# Patient Record
Sex: MALE | Race: OTHER | HISPANIC OR LATINO | URBAN - METROPOLITAN AREA
[De-identification: names, ages, dates, MRNs, and addresses within clinical notes are randomized per-mention and may not be internally consistent; named-entity substitution may affect disease eponyms.]

---

## 2018-08-07 ENCOUNTER — EMERGENCY (EMERGENCY)
Facility: HOSPITAL | Age: 33
LOS: 1 days | Discharge: HOME | End: 2018-08-07
Attending: EMERGENCY MEDICINE
Payer: COMMERCIAL

## 2018-08-07 VITALS
HEART RATE: 100 BPM | RESPIRATION RATE: 18 BRPM | TEMPERATURE: 99 F | SYSTOLIC BLOOD PRESSURE: 124 MMHG | DIASTOLIC BLOOD PRESSURE: 77 MMHG | OXYGEN SATURATION: 97 %

## 2018-08-07 VITALS — WEIGHT: 242.07 LBS | HEIGHT: 67 IN

## 2018-08-07 LAB
ALBUMIN SERPL ELPH-MCNC: 4.4 G/DL — SIGNIFICANT CHANGE UP (ref 3.3–5.2)
ALP SERPL-CCNC: 87 U/L — SIGNIFICANT CHANGE UP (ref 40–120)
ALT FLD-CCNC: 56 U/L — HIGH
ANION GAP SERPL CALC-SCNC: 15 MMOL/L — SIGNIFICANT CHANGE UP (ref 5–17)
AST SERPL-CCNC: 32 U/L — SIGNIFICANT CHANGE UP
BASOPHILS # BLD AUTO: 0 K/UL — SIGNIFICANT CHANGE UP (ref 0–0.2)
BASOPHILS NFR BLD AUTO: 0.1 % — SIGNIFICANT CHANGE UP (ref 0–2)
BILIRUB SERPL-MCNC: 0.5 MG/DL — SIGNIFICANT CHANGE UP (ref 0.4–2)
BUN SERPL-MCNC: 12 MG/DL — SIGNIFICANT CHANGE UP (ref 8–20)
CALCIUM SERPL-MCNC: 9.3 MG/DL — SIGNIFICANT CHANGE UP (ref 8.6–10.2)
CHLORIDE SERPL-SCNC: 100 MMOL/L — SIGNIFICANT CHANGE UP (ref 98–107)
CO2 SERPL-SCNC: 24 MMOL/L — SIGNIFICANT CHANGE UP (ref 22–29)
CREAT SERPL-MCNC: 0.77 MG/DL — SIGNIFICANT CHANGE UP (ref 0.5–1.3)
EOSINOPHIL # BLD AUTO: 0 K/UL — SIGNIFICANT CHANGE UP (ref 0–0.5)
EOSINOPHIL NFR BLD AUTO: 0.1 % — SIGNIFICANT CHANGE UP (ref 0–6)
GLUCOSE SERPL-MCNC: 91 MG/DL — SIGNIFICANT CHANGE UP (ref 70–115)
HCT VFR BLD CALC: 45.5 % — SIGNIFICANT CHANGE UP (ref 42–52)
HGB BLD-MCNC: 15.5 G/DL — SIGNIFICANT CHANGE UP (ref 14–18)
LIDOCAIN IGE QN: 30 U/L — SIGNIFICANT CHANGE UP (ref 22–51)
LYMPHOCYTES # BLD AUTO: 1.8 K/UL — SIGNIFICANT CHANGE UP (ref 1–4.8)
LYMPHOCYTES # BLD AUTO: 16.2 % — LOW (ref 20–55)
MCHC RBC-ENTMCNC: 28.7 PG — SIGNIFICANT CHANGE UP (ref 27–31)
MCHC RBC-ENTMCNC: 34.1 G/DL — SIGNIFICANT CHANGE UP (ref 32–36)
MCV RBC AUTO: 84.1 FL — SIGNIFICANT CHANGE UP (ref 80–94)
MONOCYTES # BLD AUTO: 0.8 K/UL — SIGNIFICANT CHANGE UP (ref 0–0.8)
MONOCYTES NFR BLD AUTO: 7.5 % — SIGNIFICANT CHANGE UP (ref 3–10)
NEUTROPHILS # BLD AUTO: 8.2 K/UL — HIGH (ref 1.8–8)
NEUTROPHILS NFR BLD AUTO: 75.8 % — HIGH (ref 37–73)
PLATELET # BLD AUTO: 226 K/UL — SIGNIFICANT CHANGE UP (ref 150–400)
POTASSIUM SERPL-MCNC: 3.6 MMOL/L — SIGNIFICANT CHANGE UP (ref 3.5–5.3)
POTASSIUM SERPL-SCNC: 3.6 MMOL/L — SIGNIFICANT CHANGE UP (ref 3.5–5.3)
PROT SERPL-MCNC: 7.4 G/DL — SIGNIFICANT CHANGE UP (ref 6.6–8.7)
RBC # BLD: 5.41 M/UL — SIGNIFICANT CHANGE UP (ref 4.6–6.2)
RBC # FLD: 13.8 % — SIGNIFICANT CHANGE UP (ref 11–15.6)
SODIUM SERPL-SCNC: 139 MMOL/L — SIGNIFICANT CHANGE UP (ref 135–145)
WBC # BLD: 10.8 K/UL — SIGNIFICANT CHANGE UP (ref 4.8–10.8)
WBC # FLD AUTO: 10.8 K/UL — SIGNIFICANT CHANGE UP (ref 4.8–10.8)

## 2018-08-07 PROCEDURE — T1013: CPT

## 2018-08-07 PROCEDURE — 74177 CT ABD & PELVIS W/CONTRAST: CPT | Mod: 26

## 2018-08-07 PROCEDURE — 36415 COLL VENOUS BLD VENIPUNCTURE: CPT

## 2018-08-07 PROCEDURE — 96374 THER/PROPH/DIAG INJ IV PUSH: CPT | Mod: XU

## 2018-08-07 PROCEDURE — 83690 ASSAY OF LIPASE: CPT

## 2018-08-07 PROCEDURE — 85027 COMPLETE CBC AUTOMATED: CPT

## 2018-08-07 PROCEDURE — 96375 TX/PRO/DX INJ NEW DRUG ADDON: CPT

## 2018-08-07 PROCEDURE — 80053 COMPREHEN METABOLIC PANEL: CPT

## 2018-08-07 PROCEDURE — 74177 CT ABD & PELVIS W/CONTRAST: CPT

## 2018-08-07 PROCEDURE — 99284 EMERGENCY DEPT VISIT MOD MDM: CPT

## 2018-08-07 PROCEDURE — 99284 EMERGENCY DEPT VISIT MOD MDM: CPT | Mod: 25

## 2018-08-07 RX ORDER — KETOROLAC TROMETHAMINE 30 MG/ML
30 SYRINGE (ML) INJECTION ONCE
Qty: 0 | Refills: 0 | Status: DISCONTINUED | OUTPATIENT
Start: 2018-08-07 | End: 2018-08-07

## 2018-08-07 RX ORDER — METOCLOPRAMIDE HCL 10 MG
10 TABLET ORAL ONCE
Qty: 0 | Refills: 0 | Status: COMPLETED | OUTPATIENT
Start: 2018-08-07 | End: 2018-08-07

## 2018-08-07 RX ORDER — SODIUM CHLORIDE 9 MG/ML
1000 INJECTION INTRAMUSCULAR; INTRAVENOUS; SUBCUTANEOUS
Qty: 0 | Refills: 0 | Status: DISCONTINUED | OUTPATIENT
Start: 2018-08-07 | End: 2018-08-12

## 2018-08-07 RX ORDER — ONDANSETRON 8 MG/1
1 TABLET, FILM COATED ORAL
Qty: 9 | Refills: 0 | OUTPATIENT
Start: 2018-08-07 | End: 2018-08-09

## 2018-08-07 RX ORDER — IBUPROFEN 200 MG
1 TABLET ORAL
Qty: 15 | Refills: 0 | OUTPATIENT
Start: 2018-08-07 | End: 2018-08-11

## 2018-08-07 RX ORDER — SODIUM CHLORIDE 9 MG/ML
1000 INJECTION INTRAMUSCULAR; INTRAVENOUS; SUBCUTANEOUS ONCE
Qty: 0 | Refills: 0 | Status: COMPLETED | OUTPATIENT
Start: 2018-08-07 | End: 2018-08-07

## 2018-08-07 RX ADMIN — Medication 10 MILLIGRAM(S): at 16:04

## 2018-08-07 RX ADMIN — SODIUM CHLORIDE 1000 MILLILITER(S): 9 INJECTION INTRAMUSCULAR; INTRAVENOUS; SUBCUTANEOUS at 16:04

## 2018-08-07 RX ADMIN — Medication 30 MILLIGRAM(S): at 16:04

## 2018-08-07 RX ADMIN — Medication 1 TABLET(S): at 19:58

## 2018-08-07 RX ADMIN — SODIUM CHLORIDE 1000 MILLILITER(S): 9 INJECTION INTRAMUSCULAR; INTRAVENOUS; SUBCUTANEOUS at 18:52

## 2018-08-07 RX ADMIN — SODIUM CHLORIDE 200 MILLILITER(S): 9 INJECTION INTRAMUSCULAR; INTRAVENOUS; SUBCUTANEOUS at 19:36

## 2018-08-07 RX ADMIN — Medication 30 MILLIGRAM(S): at 18:52

## 2018-08-07 NOTE — ED STATDOCS - DIAGNOSTIC INTERPRETATION
CT report reviewed, findings discussed with patient, po abxs for ileitis, referred to GI for outpatient evaln due to possible crohns as per CT.

## 2018-08-07 NOTE — ED ADULT NURSE NOTE - OBJECTIVE STATEMENT
pt presnets to ED with mid abd pain now resolved with n/v/d, afebrile. pt sent to ED by PMD. afebrile. denies urinary complaints. a&ox3. will continue to monitor and reassess

## 2018-08-07 NOTE — ED STATDOCS - OBJECTIVE STATEMENT
USOH until this early am when developed N/V/D and abdominal pain, generalized but RLQ on palpation, seen by PMD = Dr. Marin who referred to ED for CT to evaluate for APPY and aggressive hydration due to anorexia and decrease po.  Patient admits to chills but denies fever.  No urinary symptoms, hematuria, penile discharge, rash.  No seafood, travel, sick contacts.    PMH/PSH = denies  ALL = NKDA  MEDS = denies, not self-medicating  SH = denies smoking/drinking

## 2018-08-07 NOTE — ED STATDOCS - CARE PLAN
Principal Discharge DX:	Ileitis  Secondary Diagnosis:	Non-intractable vomiting with nausea, unspecified vomiting type

## 2018-08-07 NOTE — ED ADULT NURSE NOTE - NSIMPLEMENTINTERV_GEN_ALL_ED
Implemented All Universal Safety Interventions:  [place] to call system. Call bell, personal items and telephone within reach. Instruct patient to call for assistance. Room bathroom lighting operational. Non-slip footwear when patient is off stretcher. Physically safe environment: no spills, clutter or unnecessary equipment. Stretcher in lowest position, wheels locked, appropriate side rails in place.

## 2018-08-07 NOTE — ED STATDOCS - MEDICAL DECISION MAKING DETAILS
abd pain, mostly RLQ, with N/V/D, sent by PMD to evaluated for APPY  line, labs with lipase for pancreatitis, urine for uti/presence of blood, CT for APPY  hydrate agressively, treat symptomatically, check results, reassess

## 2018-08-07 NOTE — ED STATDOCS - TEMPLATE, MLM
General Ear Star Wedge Flap Text: The defect edges were debeveled with a #15 blade scalpel.  Given the location of the defect and the proximity to free margins (helical rim) an ear star wedge flap was deemed most appropriate.  Using a sterile surgical marker, the appropriate flap was drawn incorporating the defect and placing the expected incisions between the helical rim and antihelix where possible.  The area thus outlined was incised through and through with a #15 scalpel blade.

## 2018-08-10 PROBLEM — Z00.00 ENCOUNTER FOR PREVENTIVE HEALTH EXAMINATION: Status: ACTIVE | Noted: 2018-08-10

## 2018-10-18 ENCOUNTER — APPOINTMENT (OUTPATIENT)
Dept: GASTROENTEROLOGY | Facility: CLINIC | Age: 33
End: 2018-10-18

## 2019-03-30 ENCOUNTER — APPOINTMENT (OUTPATIENT)
Dept: CARDIOLOGY | Facility: CLINIC | Age: 34
End: 2019-03-30

## 2020-04-06 ENCOUNTER — EMERGENCY (EMERGENCY)
Facility: HOSPITAL | Age: 35
LOS: 1 days | Discharge: HOME | End: 2020-04-06
Payer: COMMERCIAL

## 2020-04-06 VITALS
WEIGHT: 251.33 LBS | DIASTOLIC BLOOD PRESSURE: 94 MMHG | SYSTOLIC BLOOD PRESSURE: 131 MMHG | TEMPERATURE: 100 F | HEART RATE: 105 BPM | OXYGEN SATURATION: 97 % | RESPIRATION RATE: 20 BRPM

## 2020-04-06 PROCEDURE — 99283 EMERGENCY DEPT VISIT LOW MDM: CPT

## 2020-04-06 PROCEDURE — T1013: CPT

## 2020-04-06 PROCEDURE — 99282 EMERGENCY DEPT VISIT SF MDM: CPT

## 2020-04-06 NOTE — ED PROVIDER NOTE - PATIENT PORTAL LINK FT
You can access the FollowMyHealth Patient Portal offered by [facility] by registering at the following website: [email or web address]. By joining [employer]’s FollowMyHealth portal, you will also be able to view your health information using other applications (apps) compatible with our system.

## 2020-04-06 NOTE — ED PROVIDER NOTE - CLINICAL SUMMARY MEDICAL DECISION MAKING FREE TEXT BOX
male hx of htn low grade fever no respiratory distress saturating 97 with ambulation. advised on viral illness self quarantine and given strict return precautions

## 2020-04-06 NOTE — ED PROVIDER NOTE - PROGRESS NOTE DETAILS
advised on viral symptoms concerning for covid infection advised on self quaraintine, monitoring symptoms, antipyretic/analgesia use and given strict return precautions   verbalizes understanding advised on viral symptoms concerning for covid infection advised on self quarantine, monitoring symptoms, antipyretic/analgesia use and given strict return precautions   verbalizes understanding

## 2020-04-06 NOTE — ED PROVIDER NOTE - OBJECTIVE STATEMENT
36 yo male hx of htn presenting to the ER with cough and fever since friday.  no chest pain no sob. states that he was coughing at work today and his boss sent him to the ER 36 yo male hx of htn presenting to the ER with cough and fever since Friday.  no chest pain no sob. states that he was coughing at work today and his boss sent him to the ER denies sick contacts or travel

## 2021-12-30 NOTE — ED ADULT TRIAGE NOTE - AS HEIGHT TYPE
Central Prior Authorization Team   Phone: [phone]      PA Initiation    Medication: testosterone (ANDROGEL) 40.5 MG/2.5GM (1.62%) GEL-PA initiated  Insurance Company: [employer] - Phone [phone] Fax [phone]  Pharmacy Filling the Rx: [facility] - [place] - [id number] [facility]  Filling Pharmacy Phone: [phone]  Filling Pharmacy Fax:    Start Date: 12/29/2021      
Prior Authorization Approval    Authorization Effective Date: 11/29/2021  Authorization Expiration Date: 12/29/2022  Medication: testosterone (ANDROGEL) 40.5 MG/2.5GM (1.62%) GEL-PA approved  Approved Dose/Quantity:   Reference #: [id number]   Insurance Company: [employer] - Phone [phone] Fax [phone]  Expected CoPay:       CoPay Card Available:      Foundation Assistance Needed:    Which Pharmacy is filling the prescription (Not needed for infusion/clinic administered): [facility] - [place] - [id number] [facility]  Pharmacy Notified: Yes  Patient Notified: No      
Prior Authorization Specialty Medication Request    Medication/Dose:   testosterone (ANDROGEL) 40.5 MG/2.5GM (1.62%)  g 5 11/18/2021  No   Sig - Route: Place 1 packet (40.5 mg) onto the skin daily - Transdermal       ICD code (if different than what is on RX):    Previously Tried and Failed:       Important Lab Values: Result Note     1 Patient Communication      Component Ref Range & Units 1 mo ago 10 mo ago 1 yr ago 2 yr ago 3 yr ago 4 yr ago 5 yr ago    Testosterone Total 240 - 950 ng/dL 357  309 CM  236 Low  CM               Rationale: every 6 months a prior authorization needs to be done. Long term stable on medication.      Insurance Name:   Insurance ID:   Insurance Phone Number:     Pharmacy Information (if different than what is on RX)  Name:    Phone:            
stated

## 2023-01-17 ENCOUNTER — EMERGENCY (EMERGENCY)
Facility: HOSPITAL | Age: 38
LOS: 1 days | Discharge: LEFT BEFORE DISCHARGE | End: 2023-01-17
Payer: SELF-PAY

## 2023-01-17 PROCEDURE — L9992: CPT

## 2023-04-03 ENCOUNTER — EMERGENCY (EMERGENCY)
Facility: HOSPITAL | Age: 38
LOS: 1 days | Discharge: HOME | End: 2023-04-03
Attending: STUDENT IN AN ORGANIZED HEALTH CARE EDUCATION/TRAINING PROGRAM
Payer: COMMERCIAL

## 2023-04-03 VITALS
HEART RATE: 80 BPM | HEIGHT: 68 IN | OXYGEN SATURATION: 99 % | RESPIRATION RATE: 18 BRPM | DIASTOLIC BLOOD PRESSURE: 103 MMHG | WEIGHT: 211.64 LBS | SYSTOLIC BLOOD PRESSURE: 148 MMHG | TEMPERATURE: 98 F

## 2023-04-03 LAB
APPEARANCE UR: ABNORMAL
BACTERIA # UR AUTO: ABNORMAL
BILIRUB UR-MCNC: NEGATIVE — SIGNIFICANT CHANGE UP
COLOR SPEC: YELLOW — SIGNIFICANT CHANGE UP
DIFF PNL FLD: NEGATIVE — SIGNIFICANT CHANGE UP
EPI CELLS # UR: SIGNIFICANT CHANGE UP
GLUCOSE UR QL: 1000 MG/DL
KETONES UR-MCNC: ABNORMAL
LEUKOCYTE ESTERASE UR-ACNC: ABNORMAL
NITRITE UR-MCNC: NEGATIVE — SIGNIFICANT CHANGE UP
PH UR: 5 — SIGNIFICANT CHANGE UP (ref 5–8)
PROT UR-MCNC: 15
RBC CASTS # UR COMP ASSIST: NEGATIVE /HPF — SIGNIFICANT CHANGE UP (ref 0–4)
SP GR SPEC: 1.01 — SIGNIFICANT CHANGE UP (ref 1.01–1.02)
UROBILINOGEN FLD QL: NEGATIVE MG/DL — SIGNIFICANT CHANGE UP
WBC UR QL: SIGNIFICANT CHANGE UP /HPF (ref 0–5)

## 2023-04-03 PROCEDURE — 81001 URINALYSIS AUTO W/SCOPE: CPT

## 2023-04-03 PROCEDURE — 87086 URINE CULTURE/COLONY COUNT: CPT

## 2023-04-03 PROCEDURE — 99283 EMERGENCY DEPT VISIT LOW MDM: CPT

## 2023-04-03 PROCEDURE — T1013: CPT

## 2023-04-03 PROCEDURE — 87491 CHLMYD TRACH DNA AMP PROBE: CPT

## 2023-04-03 PROCEDURE — 99284 EMERGENCY DEPT VISIT MOD MDM: CPT | Mod: 25

## 2023-04-03 PROCEDURE — 99284 EMERGENCY DEPT VISIT MOD MDM: CPT

## 2023-04-03 PROCEDURE — 87591 N.GONORRHOEAE DNA AMP PROB: CPT

## 2023-04-03 PROCEDURE — 82962 GLUCOSE BLOOD TEST: CPT

## 2023-04-03 NOTE — ED PROVIDER NOTE - CLINICAL SUMMARY MEDICAL DECISION MAKING FREE TEXT BOX
39 y/o M with balanitis  ua/uc  topical clotrimazole  finger stick 37 y/o M with balanitis  ua/uc  topical clotrimazole  finger stick    exam most consistent with balanitis, will check FS to eval for diabetic control, otherwise will encourage hygeine, topical clotrimazole cream and sitz baths. check ua and gc/chalmydia as well as give uro followup.

## 2023-04-03 NOTE — ED PROVIDER NOTE - PENIS
multiple superficial lacerations on foreskin/uncircumcised/PHIMOSIS multiple superficial lacerations on foreskin, difficulty retracting foreskin, no penile discharge/uncircumcised/PHIMOSIS

## 2023-04-03 NOTE — ED PROVIDER NOTE - ATTENDING APP SHARED VISIT CONTRIBUTION OF CARE
I, Angie Fairchild MD, have reviewed the ACP's documentation. After personally examining the patient, getting an independent history, and formulating an MDM, my findings have been added to this documentation as indicated. see mdm      Elana

## 2023-04-03 NOTE — ED ADULT TRIAGE NOTE - SOURCE OF INFORMATION
Patient Quality 111:Pneumonia Vaccination Status For Older Adults: Pneumococcal Vaccination Previously Received Detail Level: Detailed Quality 110: Preventive Care And Screening: Influenza Immunization: Influenza Immunization Administered during Influenza season

## 2023-04-03 NOTE — ED PROVIDER NOTE - PROGRESS NOTE DETAILS
offered to draw baseline labs and start pt on metformin - want to leave offered to draw baseline labs and start pt on metformin - want to leave    Angie Fairchild MD Attending Physician- do not recommend initiating metformin from ER without baseline labs in case of severe renal dysfunction. will expedite endo followup

## 2023-04-03 NOTE — ED PROVIDER NOTE - CARE PROVIDER_API CALL
Margaret Parish)  EndocrinologyMetabDiabetes; Internal Medicine  [address] [place] [address]  Phone: [phone]  Fax: [phone]  Follow Up Time:

## 2023-04-03 NOTE — ED PROVIDER NOTE - PATIENT PORTAL LINK FT
You can access the FollowMyHealth Patient Portal offered by [facility] by registering at the following website: [email or web address]. By joining [employer]’s FollowMyHealth portal, you will also be able to view your health information using other applications (apps) compatible with our system. You can access the FollowMyHealth Patient Portal offered by [facility] by registering at the following website: [email or web address]. By joining [employer]’s FollowMyHealth portal, you will also be able to view your health information using other applications (apps) compatible with our system.

## 2023-04-03 NOTE — ED ADULT TRIAGE NOTE - CHIEF COMPLAINT QUOTE
pt states he has an infection to penis x 6 months, states he has drainage,   saw MD was given a cream, not better

## 2023-04-03 NOTE — ED PROVIDER NOTE - OBJECTIVE STATEMENT
39 y/o M c/o pain to tip of penis x 6 months.  Patient has these symptoms intermittently x 6 months, for which he's used a cream prescribed by a clinic.  Denies fever or dysuria.  Denies back pain. 39 y/o M c/o pain to tip of penis x 6 months.  Patient has these symptoms intermittently x 6 months, for which he's used a cream prescribed by a clinic.  Denies fever or dysuria.  Denies back pain. No hx std/sti, no tetsicular pain or tendenress

## 2023-04-03 NOTE — ED PROVIDER NOTE - NS ED ATTENDING STATEMENT MOD
This was a shared visit with the COLEEN. I reviewed and verified the documentation and independently performed the documented:

## 2023-04-05 LAB
C TRACH RRNA SPEC QL NAA+PROBE: SIGNIFICANT CHANGE UP
CULTURE RESULTS: SIGNIFICANT CHANGE UP
N GONORRHOEA RRNA SPEC QL NAA+PROBE: SIGNIFICANT CHANGE UP
SPECIMEN SOURCE: SIGNIFICANT CHANGE UP
SPECIMEN SOURCE: SIGNIFICANT CHANGE UP